# Patient Record
Sex: MALE | Race: WHITE | Employment: FULL TIME | URBAN - METROPOLITAN AREA
[De-identification: names, ages, dates, MRNs, and addresses within clinical notes are randomized per-mention and may not be internally consistent; named-entity substitution may affect disease eponyms.]

---

## 2021-03-07 ENCOUNTER — APPOINTMENT (OUTPATIENT)
Dept: GENERAL RADIOLOGY | Age: 41
End: 2021-03-07
Payer: COMMERCIAL

## 2021-03-07 ENCOUNTER — HOSPITAL ENCOUNTER (EMERGENCY)
Age: 41
Discharge: HOME | End: 2021-03-07
Attending: EMERGENCY MEDICINE
Payer: COMMERCIAL

## 2021-03-07 VITALS
WEIGHT: 240 LBS | OXYGEN SATURATION: 97 % | BODY MASS INDEX: 39.99 KG/M2 | SYSTOLIC BLOOD PRESSURE: 151 MMHG | HEART RATE: 90 BPM | HEIGHT: 65 IN | DIASTOLIC BLOOD PRESSURE: 96 MMHG | RESPIRATION RATE: 18 BRPM | TEMPERATURE: 98 F

## 2021-03-07 DIAGNOSIS — S63.501A SPRAIN OF RIGHT WRIST, INITIAL ENCOUNTER: Primary | ICD-10-CM

## 2021-03-07 PROCEDURE — 6370000000 HC RX 637 (ALT 250 FOR IP): Performed by: EMERGENCY MEDICINE

## 2021-03-07 PROCEDURE — 99283 EMERGENCY DEPT VISIT LOW MDM: CPT

## 2021-03-07 PROCEDURE — 73110 X-RAY EXAM OF WRIST: CPT

## 2021-03-07 RX ORDER — ACETAMINOPHEN 325 MG/1
650 TABLET ORAL ONCE
Status: COMPLETED | OUTPATIENT
Start: 2021-03-07 | End: 2021-03-07

## 2021-03-07 RX ORDER — NAPROXEN 500 MG/1
500 TABLET ORAL ONCE
Status: COMPLETED | OUTPATIENT
Start: 2021-03-07 | End: 2021-03-07

## 2021-03-07 RX ADMIN — NAPROXEN 500 MG: 500 TABLET ORAL at 19:25

## 2021-03-07 RX ADMIN — ACETAMINOPHEN 650 MG: 325 TABLET ORAL at 19:26

## 2021-03-07 ASSESSMENT — PAIN DESCRIPTION - ONSET: ONSET: SUDDEN

## 2021-03-07 ASSESSMENT — PAIN DESCRIPTION - ORIENTATION: ORIENTATION: RIGHT

## 2021-03-07 ASSESSMENT — PAIN DESCRIPTION - FREQUENCY: FREQUENCY: INTERMITTENT

## 2021-03-08 NOTE — ED TRIAGE NOTES
Pt to the ED for c/o Rt wrist pain after tripping and falling last night causing injury. No obvious deformity noted in triage. Pt A&O x4 and reports pain 3/10 while holding Rt wrist still but pain increases to 9/10 with movement.

## 2021-03-08 NOTE — ED PROVIDER NOTES
EMERGENCY DEPARTMENT ENCOUNTER    Patient: Lorna Castorena  MRN: [id number]  : 1980  Date of Evaluation: 3/7/2021  ED Provider:  Perla Clifton    CHIEF COMPLAINT  Chief Complaint   Patient presents with    Wrist Injury     Rt wrist injury from fall        HPI  Lorna Castorena is a 47 y.o. male who presents right wrist pain after tripping and falling last night while intoxicated. States he fell on his outstretched right hand. Did not hit his head or have loss consciousness. Pain is moderate in severity and constant and worsened with movement of the right wrist however he still has normal range of motion of the wrist and all fingers with normal motor and sensory function. Denies pain anywhere else. Denies any other associated symptoms or complaints or concerns. REVIEW OF SYSTEMS    Constitutional: negative for fever, chills  Neurological: negative for HA, focal weakness, loss of sensation  Ophthalmic: negative for vision change  ENT: negative for congestion, rhinorrhea  Cardiovascular: negative for chest pain  Respiratory: negative for SOB, cough  GI: negative for abdominal pain, nausea, vomiting, diarrhea, constipation  : negative for dysuria, hematuria  Musculoskeletal: negative for myalgias  Dermatological: negative for rash, wounds  Heme: Negative for bleeding, bruising      PAST MEDICAL HISTORY  History reviewed. No pertinent past medical history. CURRENT MEDICATIONS  [unfilled]    ALLERGIES  No Known Allergies    SURGICAL HISTORY  History reviewed. No pertinent surgical history. FAMILY HISTORY  History reviewed. No pertinent family history.     SOCIAL HISTORY  Social History     Socioeconomic History    Marital status:      Spouse name: None    Number of children: None    Years of education: None    Highest education level: None   Occupational History    None   Social Needs    Financial resource strain: None    Food insecurity     Worry: None     Inability: None    Transportation needs     Medical: None     Non-medical: None   Tobacco Use    Smoking status: Current Every Day Smoker     Packs/day: 0.75     Types: Cigarettes    Smokeless tobacco: Never Used   Substance and Sexual Activity    Alcohol use: Not Currently     Comment: occ    Drug use: Not Currently    Sexual activity: None   Lifestyle    Physical activity     Days per week: None     Minutes per session: None    Stress: None   Relationships    Social connections     Talks on phone: None     Gets together: None     Attends Baptism service: None     Active member of club or organization: None     Attends meetings of clubs or organizations: None     Relationship status: None    Intimate partner violence     Fear of current or ex partner: None     Emotionally abused: None     Physically abused: None     Forced sexual activity: None   Other Topics Concern    None   Social History Narrative    None         **Past medical, family and social histories, and nursing notes reviewed and verified by me**      PHYSICAL EXAM  VITAL SIGNS:   ED Triage Vitals [03/07/21 1904]   Enc Vitals Group      BP (!) 151/96      Pulse 90      Resp 18      Temp 98 °F (36.7 °C)      Temp src       SpO2 97 %      Weight 240 lb (108.9 kg)      Height 5' 5\" (1.651 m)      Head Circumference       Peak Flow       Pain Score       Pain Loc       Pain Edu? Excl. in [address]? Vitals during ED course were reviewed and are as charted. Constitutional: Minimal distress, Non-toxic appearance  Eyes: Conjunctiva normal, No discharge  HENT: Normocephalic, Atraumatic  Neck: Supple, no stridor, no grossly visible or palpable masses  Cardiovascular: Regular rate and rhythm, No murmurs, No rubs, No gallops  Pulmonary/Chest: Normal breath sounds, No respiratory distress or accessory muscle use, No wheezing, crackles or rhonchi.   Abdomen: Soft, nondistended and nonrigid, No tenderness or peritoneal signs, No masses  Back: No midline point tenderness, No paraspinous muscle tenderness. No CVA tenderness  Extremities: Tenderness to palpation throughout the right wrist without palpable deformities but no direct tenderness to palpation over anatomical snuffbox. Otherwise no tenderness palpation elsewhere throughout the hand or fingers the patient has normal range of motion of the right wrist and fingers of the right hand with normal motor and sensory function. Radial pulse 2+ with cap refill less than 2 seconds in the fingers. Otherwise also there are no gross deformities, no edema, no tenderness  Neurologic: Normal motor function, Normal sensory function, No focal deficits  Skin: Warm, Dry, No erythema, No rash, No cyanosis, No mottling  Psychiatric: Alert and oriented x3, Affect normal        RADIOLOGY/PROCEDURES/LABS/MEDICATIONS ADMINISTERED:    I have reviewed and interpreted all of the currently available lab results from this visit (if applicable):  No results found for this visit on 03/07/21. ABNORMAL LABS:  Labs Reviewed - No data to display      IMAGING STUDIES ORDERED:  XR WRIST RIGHT (MIN 3 VIEWS)  APPLY ACE WRAP    I have personally viewed the imaging studies. The radiologist interpretation is:  XR WRIST RIGHT (MIN 3 VIEWS)   Final Result   No acute abnormality of the wrist.               MEDICATIONS ADMINISTERED:  Medications   naproxen (NAPROSYN) tablet 500 mg (500 mg Oral Given 3/7/21 1925)   acetaminophen (TYLENOL) tablet 650 mg (650 mg Oral Given 3/7/21 1926)         COURSE & MEDICAL DECISION MAKING  Last vitals: BP (!) 151/96   Pulse 90   Temp 98 °F (36.7 °C)   Resp 18   Ht 5' 5\" (1.651 m)   Wt 240 lb (108.9 kg)   SpO2 97%   BMI 39.94 kg/m²     53-year-old male with right wrist sprain after fall. No evidence for fracture or dislocation clinically or radiographically. He is neurovascularly intact. Additional workup and treatment in the ED as documented above. Patient reassured and will be discharged home.  I have explained to the patient in appropriate terminology our work-up in the ED and their diagnosis. I have also given anticipatory guidance and expectant management of their condition as an outpatient as per my custom. The patient was given clear discharge and follow-up instructions including return to the ER immediately for worsening concerns. The patient has been advised to follow-up with their primary care physician and/or referred physician in the next two to three days or sooner if worsening and to return to the ER immediately as above with any concerns. I provided the patient counseling with regard to my customary list of strict return precautions as well as return precautions specific to the cause for today's emergency department visit. The patient will return under these provided conditions, but should also return for new concerns or further worsening. Pt and/or family understand and agree with plan. Clinical Impression:  1. Sprain of right wrist, initial encounter        Disposition referral (if applicable):  Mani Verdin MD  [place] [id number] [phone]    Schedule an appointment as soon as possible for a visit       [place] [phone]    If symptoms worsen      Disposition medications (if applicable): There are no discharge medications for this patient. ED Provider Disposition Time  DISPOSITION Decision To Discharge 03/07/2021 07:44:44 PM          Electronically signed by: Esthela Michael M.D., 3/7/2021 9:06 PM      This dictation was created with voice recognition software. While attempts have been made to review the dictation as it is transcribed, on occasion the spoken word can be misinterpreted by the technology leading to omissions or inappropriate words, phrases or sentences.       Michelle Meza MD  03/07/21 5914